# Patient Record
Sex: MALE | Race: WHITE | NOT HISPANIC OR LATINO | Employment: STUDENT | URBAN - METROPOLITAN AREA
[De-identification: names, ages, dates, MRNs, and addresses within clinical notes are randomized per-mention and may not be internally consistent; named-entity substitution may affect disease eponyms.]

---
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
## 2023-01-18 DIAGNOSIS — E10.65 TYPE 1 DIABETES MELLITUS WITH HYPERGLYCEMIA: Chronic | ICD-10-CM

## 2023-01-18 RX ORDER — PROCHLORPERAZINE 25 MG/1
SUPPOSITORY RECTAL
Qty: 1 EACH | Refills: 3 | Status: SHIPPED | OUTPATIENT
Start: 2023-01-18

## 2023-03-16 DIAGNOSIS — E10.65 TYPE 1 DIABETES MELLITUS WITH HYPERGLYCEMIA: Chronic | ICD-10-CM

## 2023-03-17 RX ORDER — INSULIN ASPART 100 [IU]/ML
INJECTION, SOLUTION INTRAVENOUS; SUBCUTANEOUS
Qty: 40 ML | Refills: 5 | Status: SHIPPED | OUTPATIENT
Start: 2023-03-17

## 2023-04-21 ENCOUNTER — OFFICE VISIT (OUTPATIENT)
Dept: ENDOCRINOLOGY | Facility: CLINIC | Age: 18
End: 2023-04-21
Payer: COMMERCIAL

## 2023-04-21 VITALS
HEIGHT: 72 IN | OXYGEN SATURATION: 98 % | WEIGHT: 194 LBS | HEART RATE: 84 BPM | RESPIRATION RATE: 18 BRPM | BODY MASS INDEX: 26.28 KG/M2 | SYSTOLIC BLOOD PRESSURE: 120 MMHG | DIASTOLIC BLOOD PRESSURE: 72 MMHG

## 2023-04-21 DIAGNOSIS — Z86.39 HISTORY OF HYPOTHYROIDISM: ICD-10-CM

## 2023-04-21 DIAGNOSIS — E10.65 TYPE 1 DIABETES MELLITUS WITH HYPERGLYCEMIA: Primary | Chronic | ICD-10-CM

## 2023-04-21 DIAGNOSIS — Z96.41 PRESENCE OF INSULIN PUMP: ICD-10-CM

## 2023-04-21 LAB
ALBUMIN SERPL-MCNC: 4.6 G/DL (ref 3.5–5.2)
ALBUMIN UR-MCNC: <1.2 MG/DL
ALBUMIN/GLOB SERPL: 1.8 G/DL
ALP SERPL-CCNC: 79 U/L (ref 39–117)
ALT SERPL W P-5'-P-CCNC: 19 U/L (ref 1–41)
ANION GAP SERPL CALCULATED.3IONS-SCNC: 12.4 MMOL/L (ref 5–15)
AST SERPL-CCNC: 21 U/L (ref 1–40)
BILIRUB SERPL-MCNC: 0.5 MG/DL (ref 0–1.2)
BUN SERPL-MCNC: 13 MG/DL (ref 6–20)
BUN/CREAT SERPL: 12.1 (ref 7–25)
CALCIUM SPEC-SCNC: 9.6 MG/DL (ref 8.6–10.5)
CHLORIDE SERPL-SCNC: 101 MMOL/L (ref 98–107)
CHOLEST SERPL-MCNC: 154 MG/DL (ref 0–200)
CO2 SERPL-SCNC: 26.6 MMOL/L (ref 22–29)
CREAT SERPL-MCNC: 1.07 MG/DL (ref 0.76–1.27)
CREAT UR-MCNC: 46.8 MG/DL
EGFRCR SERPLBLD CKD-EPI 2021: 102.5 ML/MIN/1.73
EXPIRATION DATE: NORMAL
GLOBULIN UR ELPH-MCNC: 2.5 GM/DL
GLUCOSE BLDC GLUCOMTR-MCNC: 172 MG/DL (ref 70–130)
GLUCOSE SERPL-MCNC: 154 MG/DL (ref 65–99)
HBA1C MFR BLD: 8.2 %
HDLC SERPL-MCNC: 55 MG/DL (ref 40–60)
LDLC SERPL CALC-MCNC: 86 MG/DL (ref 0–100)
LDLC/HDLC SERPL: 1.56 {RATIO}
Lab: NORMAL
MICROALBUMIN/CREAT UR: NORMAL MG/G{CREAT}
POTASSIUM SERPL-SCNC: 4.4 MMOL/L (ref 3.5–5.2)
PROT SERPL-MCNC: 7.1 G/DL (ref 6–8.5)
SODIUM SERPL-SCNC: 140 MMOL/L (ref 136–145)
TRIGL SERPL-MCNC: 67 MG/DL (ref 0–150)
VLDLC SERPL-MCNC: 13 MG/DL (ref 5–40)

## 2023-04-21 PROCEDURE — 82043 UR ALBUMIN QUANTITATIVE: CPT | Performed by: PHYSICIAN ASSISTANT

## 2023-04-21 PROCEDURE — 80053 COMPREHEN METABOLIC PANEL: CPT | Performed by: PHYSICIAN ASSISTANT

## 2023-04-21 PROCEDURE — 80061 LIPID PANEL: CPT | Performed by: PHYSICIAN ASSISTANT

## 2023-04-21 PROCEDURE — 82570 ASSAY OF URINE CREATININE: CPT | Performed by: PHYSICIAN ASSISTANT

## 2023-04-21 PROCEDURE — 84443 ASSAY THYROID STIM HORMONE: CPT | Performed by: PHYSICIAN ASSISTANT

## 2023-04-21 NOTE — PROGRESS NOTES
"     Office Note      Date: 2023  Patient Name: Chapin Pillai  MRN: [id number]  : 2005    Chief Complaint   Patient presents with   • Diabetes       History of Present Illness  Chapin Pillai is a 17 y.o. male who presents today for follow up on type 1 diabetes.   Diabetes was diagnosed in .  Known diabetic complications: none.  He remains on the Tandem X2 insulin pump.  He is using the Dexcom G6 CGM.  He has not updated to Control-IQ yet.  He is monitoring glucose ~288 times per day using CGM.  He is frequently adjusting insulin based on glucose readings.  He notes occasional hypoglycemia.  He denies any severe hypoglycemia.  Feet: No issues.  Foot exam today.  Eye exam is due.  ACE inhibitor/ARB: Not indicated.  Statin: Not indicated.  He is working for [employer].  Hours 5AM to 1:30-2PM.  He reports getting 25,000 steps per day.    Review of Systems   Constitutional: Negative.    Cardiovascular: Negative.    Gastrointestinal: Negative.    Endocrine: Negative.      Past Medical History:   Diagnosis Date   • COVID-19    • Hirschsprung's disease    • Hypothyroidism (acquired)    • Type 1 diabetes mellitus       Past Surgical History:   Procedure Laterality Date   • COLON SURGERY      Multiple surgeries, partial colectomy, ileostomy, reversal of ileostomy     The following portions of the patient's history were reviewed and updated as appropriate: allergies, current medications, past family history, past medical history, past social history, past surgical history and problem list.    Vitals:  /72   Pulse 84   Resp 18   Ht 182.9 cm (72\")   Wt 88 kg (194 lb)   SpO2 98%   BMI 26.31 kg/m²     Physical Exam  Vitals reviewed.   Constitutional:       General: He is not in acute distress.  Neck:      Thyroid: No thyroid mass, thyromegaly (firm, not significantly enlarged) or thyroid tenderness.   Cardiovascular:      Pulses:           Dorsalis pedis pulses are 2+ on the right side and 2+ on the left " side.        Posterior tibial pulses are 2+ on the right side and 2+ on the left side.   Musculoskeletal:      Right foot: No deformity.      Left foot: No deformity.   Feet:      Right foot:      Protective Sensation: 8 sites tested. 8 sites sensed.      Skin integrity: Skin integrity normal.      Toenail Condition: Right toenails are normal.      Left foot:      Protective Sensation: 8 sites tested. 8 sites sensed.      Skin integrity: Skin integrity normal.      Toenail Condition: Left toenails are normal.      Comments: Diabetic Foot Exam Performed and Monofilament Test Performed  Lymphadenopathy:      Cervical: No cervical adenopathy.   Neurological:      Mental Status: He is alert and oriented to person, place, and time.   Psychiatric:         Mood and Affect: Mood and affect normal.         Labs/Imaging    Hemoglobin A1c  Lab Results   Component Value Date    HGBA1C 8.2 04/21/2023    HGBA1C 7.8 09/23/2022    HGBA1C 8.4 04/27/2022     Glucose  Lab Results   Component Value Date    POCGLU 172 (A) 04/21/2023       Current Outpatient Medications   Medication Instructions   • BD Pen Needle Pati 2nd Gen 32G X 4 MM misc USE TO INJECT INSULIN WITH PEN IF PUMP NOT WORKING   • Continuous Blood Gluc Sensor (Dexcom G6 Sensor) Does not apply, Every 10 Days   • Continuous Blood Gluc Transmit (Dexcom G6 Transmitter) misc CHANGE EVERY 3 MONTHS   • insulin aspart (NovoLOG FlexPen) 100 UNIT/ML solution pen-injector sc pen PRN off insulin pump: inject 1 unit per 7 g carbs with meals plus correction 1 unit per 38 over 130; max 60 units daily.   • Insulin Glargine (LANTUS SOLOSTAR) 100 UNIT/ML injection pen INJECT 34 UNITS UNDER THE SKIN EVERY DAY IF PUMP OFF   • Insulin Infusion Pump (T:slim X2 Ins Pump/Control-IQ) device Basal 12a 1.2; carb ratio 1:7, correction 1:28, target 130, AIT 3.5h hours   • Insulin Infusion Pump Supplies (T:slim X2 3mL Cartridge) misc CHANGE EVERY TWO (2) DAYS AS DIRECTED   • Insulin Infusion Pump  "Supplies (TruSteel Infusion Set) misc CHANGE EVERY TWO (2) DAYS AS DIRECTED   • Insulin Syringe-Needle U-100 (B-D INS SYRINGE 0.5CC/31GX5/16) 31G X 5/16\" 0.5 ML misc Use 1 daily PRN for insulin injections.   • NovoLOG 100 UNIT/ML injection INJECT UP  UNITS PER DAY VIA INSULIN PUMP AS DIRECTED   • OneTouch Verio test strip Testing once per day PRN along with CGM       Assessment & Plan  1. Type 1 diabetes mellitus with hyperglycemia  Diabetes remains uncontrolled with hyperglycemia.  A1c increased, above goal today at 8.2%.  Reviewed pump/CGM data and discussed with patient.  Sensor glucose average 206 for the past 2 weeks, 40% time in range , 1% low 54-69, <1% very low <54, 25% high >250.  He is giving manual boluses.  Encouraged to start entering carbohydrates and using bolus calculator.  Adjust correction factor to 1:28.  Plan to start using Control-IQ.  Check labs today.  Will go ahead and check nonfasting lipids today.  He ate breakfast burrito, popsicle, and energy drink with sugar.  Encouraged nutritious dietary choices, moderation of carbohydrates, avoidance of added sugar and regular exercise.   - POC Glucose, Blood  - POC Glycosylated Hemoglobin (Hb A1C)  - Comprehensive Metabolic Panel; Future  - Lipid Panel; Future  - Microalbumin / Creatinine Urine Ratio - Urine, Clean Catch; Future    2. History of hypothyroidism  - TSH; Future    Will notify him of pending lab results from today.     Return in about 3 months (around 7/21/2023) for next scheduled follow up. He was advised to contact the office with any interval questions or concerns.    GUERITA Engel  Endocrinology  04/21/2023  "

## 2023-04-22 LAB — TSH SERPL DL<=0.05 MIU/L-ACNC: 2.5 UIU/ML (ref 0.27–4.2)

## 2023-05-21 DIAGNOSIS — E10.65 TYPE 1 DIABETES MELLITUS WITH HYPERGLYCEMIA: Chronic | ICD-10-CM

## 2023-05-22 RX ORDER — PROCHLORPERAZINE 25 MG/1
SUPPOSITORY RECTAL
Qty: 3 EACH | Refills: 5 | Status: SHIPPED | OUTPATIENT
Start: 2023-05-22

## 2023-09-08 DIAGNOSIS — E10.65 TYPE 1 DIABETES MELLITUS WITH HYPERGLYCEMIA: Chronic | ICD-10-CM

## 2023-09-08 RX ORDER — PROCHLORPERAZINE 25 MG/1
1 SUPPOSITORY RECTAL
Qty: 1 EACH | Refills: 3 | Status: SHIPPED | OUTPATIENT
Start: 2023-09-08

## 2023-09-08 NOTE — TELEPHONE ENCOUNTER
Rx Refill Note  Requested Prescriptions     Pending Prescriptions Disp Refills    Continuous Blood Gluc Transmit (Dexcom G6 Transmitter) misc 1 each 3    Dx Code:  E10.65  Last office visit with prescribing clinician: Visit date not found   Last telemedicine visit with prescribing clinician: Visit date not found   Next office visit with prescribing clinician: 1/12/2024                         Would you like a call back once the refill request has been completed: [] Yes [] No    If the office needs to give you a call back, can they leave a voicemail: [] Yes [] No    Carol Love MA  09/08/23, 10:07 EDT

## 2023-09-08 NOTE — TELEPHONE ENCOUNTER
PATIENTS FATHER ACCIDENTALLY THREW AWAY PATIENTS TRANSMITTER AND IS NOT ABLE TO GET A NEW ONE FROM PHARMACY TILL NOVEMBER. PATIENTS MOTHER IS CALLING NEEDING A NEW PRESCRIPTION WITH REFILLS SO PATIENT CAN GET TRANSMITTER AND REFILLS. PHONE NUMBER [phone]

## 2023-10-11 ENCOUNTER — PRIOR AUTHORIZATION (OUTPATIENT)
Dept: ENDOCRINOLOGY | Facility: CLINIC | Age: 18
End: 2023-10-11
Payer: COMMERCIAL

## 2023-10-11 NOTE — TELEPHONE ENCOUNTER
PILO GROSSMAN Key: [id number] - PA Case ID: [id number] - Rx #: [id number] help? Call us at [phone]  Outcome  Approvedtoday  Your PA request has been approved. Additional information will be provided in the approval communication. (Message 1145)  Drug  Dexcom G6 Sensor  Form  [employer] Electronic PA Form (2017 NCPDP

## 2023-12-26 DIAGNOSIS — E10.65 TYPE 1 DIABETES MELLITUS WITH HYPERGLYCEMIA: Chronic | ICD-10-CM

## 2023-12-26 RX ORDER — INSULIN ASPART 100 [IU]/ML
INJECTION, SOLUTION INTRAVENOUS; SUBCUTANEOUS
Qty: 40 ML | Refills: 12 | Status: SHIPPED | OUTPATIENT
Start: 2023-12-26

## 2023-12-26 NOTE — TELEPHONE ENCOUNTER
Rx Refill Note  Requested Prescriptions      No prescriptions requested or ordered in this encounter        Last office visit with prescribing clinician: 4/12/23     Next office visit with prescribing clinician: 1/16/2024       Kimberly Watson (Jodi)  12/26/23, 13:51 EST

## 2024-02-08 ENCOUNTER — OFFICE VISIT (OUTPATIENT)
Dept: ENDOCRINOLOGY | Facility: CLINIC | Age: 19
End: 2024-02-08
Payer: COMMERCIAL

## 2024-02-08 VITALS
BODY MASS INDEX: 26.47 KG/M2 | RESPIRATION RATE: 16 BRPM | OXYGEN SATURATION: 98 % | SYSTOLIC BLOOD PRESSURE: 126 MMHG | HEIGHT: 72 IN | DIASTOLIC BLOOD PRESSURE: 70 MMHG | WEIGHT: 195.4 LBS | HEART RATE: 80 BPM

## 2024-02-08 DIAGNOSIS — E10.65 TYPE 1 DIABETES MELLITUS WITH HYPERGLYCEMIA: Primary | ICD-10-CM

## 2024-02-08 LAB
ALBUMIN SERPL-MCNC: 4.4 G/DL (ref 3.5–5.2)
ALBUMIN UR-MCNC: <1.2 MG/DL
ALBUMIN/GLOB SERPL: 2.2 G/DL
ALP SERPL-CCNC: 80 U/L (ref 39–117)
ALT SERPL W P-5'-P-CCNC: 23 U/L (ref 1–41)
ANION GAP SERPL CALCULATED.3IONS-SCNC: 8 MMOL/L (ref 5–15)
AST SERPL-CCNC: 18 U/L (ref 1–40)
BILIRUB SERPL-MCNC: 0.5 MG/DL (ref 0–1.2)
BUN SERPL-MCNC: 12 MG/DL (ref 6–20)
BUN/CREAT SERPL: 12.6 (ref 7–25)
CALCIUM SPEC-SCNC: 9 MG/DL (ref 8.6–10.5)
CHLORIDE SERPL-SCNC: 101 MMOL/L (ref 98–107)
CHOLEST SERPL-MCNC: 142 MG/DL (ref 0–200)
CO2 SERPL-SCNC: 27 MMOL/L (ref 22–29)
CREAT SERPL-MCNC: 0.95 MG/DL (ref 0.76–1.27)
CREAT UR-MCNC: 66.8 MG/DL
EGFRCR SERPLBLD CKD-EPI 2021: 117.5 ML/MIN/1.73
EXPIRATION DATE: ABNORMAL
GLOBULIN UR ELPH-MCNC: 2 GM/DL
GLUCOSE BLDC GLUCOMTR-MCNC: 221 MG/DL (ref 70–130)
GLUCOSE SERPL-MCNC: 252 MG/DL (ref 65–99)
HBA1C MFR BLD: 10.5 % (ref 4.5–5.7)
HDLC SERPL-MCNC: 57 MG/DL (ref 40–60)
LDLC SERPL CALC-MCNC: 64 MG/DL (ref 0–100)
LDLC/HDLC SERPL: 1.08 {RATIO}
Lab: ABNORMAL
MICROALBUMIN/CREAT UR: NORMAL MG/G{CREAT}
POTASSIUM SERPL-SCNC: 4.2 MMOL/L (ref 3.5–5.2)
PROT SERPL-MCNC: 6.4 G/DL (ref 6–8.5)
SODIUM SERPL-SCNC: 136 MMOL/L (ref 136–145)
T4 FREE SERPL-MCNC: 1.23 NG/DL (ref 0.93–1.7)
TRIGL SERPL-MCNC: 118 MG/DL (ref 0–150)
TSH SERPL DL<=0.05 MIU/L-ACNC: 2.31 UIU/ML (ref 0.27–4.2)
VLDLC SERPL-MCNC: 21 MG/DL (ref 5–40)

## 2024-02-08 PROCEDURE — 82947 ASSAY GLUCOSE BLOOD QUANT: CPT | Performed by: PHYSICIAN ASSISTANT

## 2024-02-08 PROCEDURE — 99214 OFFICE O/P EST MOD 30 MIN: CPT | Performed by: PHYSICIAN ASSISTANT

## 2024-02-08 PROCEDURE — 83036 HEMOGLOBIN GLYCOSYLATED A1C: CPT | Performed by: PHYSICIAN ASSISTANT

## 2024-02-08 PROCEDURE — 80053 COMPREHEN METABOLIC PANEL: CPT | Performed by: PHYSICIAN ASSISTANT

## 2024-02-08 PROCEDURE — 84439 ASSAY OF FREE THYROXINE: CPT | Performed by: PHYSICIAN ASSISTANT

## 2024-02-08 PROCEDURE — 82570 ASSAY OF URINE CREATININE: CPT | Performed by: PHYSICIAN ASSISTANT

## 2024-02-08 PROCEDURE — 80061 LIPID PANEL: CPT | Performed by: PHYSICIAN ASSISTANT

## 2024-02-08 PROCEDURE — 84443 ASSAY THYROID STIM HORMONE: CPT | Performed by: PHYSICIAN ASSISTANT

## 2024-02-08 PROCEDURE — 82043 UR ALBUMIN QUANTITATIVE: CPT | Performed by: PHYSICIAN ASSISTANT

## 2024-02-08 RX ORDER — BUPROPION HYDROCHLORIDE 150 MG/1
150 TABLET ORAL EVERY MORNING
COMMUNITY
Start: 2023-11-28 | End: 2024-02-08

## 2024-02-08 RX ORDER — PROCHLORPERAZINE 25 MG/1
1 SUPPOSITORY RECTAL
Qty: 1 EACH | Refills: 3 | Status: SHIPPED | OUTPATIENT
Start: 2024-02-08

## 2024-02-08 RX ORDER — INSULIN ASPART 100 [IU]/ML
INJECTION, SOLUTION INTRAVENOUS; SUBCUTANEOUS
Qty: 40 ML | Refills: 12 | Status: SHIPPED | OUTPATIENT
Start: 2024-02-08

## 2024-02-08 RX ORDER — PROCHLORPERAZINE 25 MG/1
SUPPOSITORY RECTAL
Qty: 3 EACH | Refills: 5 | Status: SHIPPED | OUTPATIENT
Start: 2024-02-08

## 2024-02-08 RX ORDER — INSULIN ASPART 100 [IU]/ML
INJECTION, SOLUTION INTRAVENOUS; SUBCUTANEOUS
Qty: 15 ML | Refills: 0 | Status: SHIPPED | OUTPATIENT
Start: 2024-02-08

## 2024-02-08 RX ORDER — INSULIN PUMP CARTRIDGE
CARTRIDGE (EA) SUBCUTANEOUS
Qty: 45 EACH | Refills: 3 | Status: CANCELLED | OUTPATIENT
Start: 2024-02-08

## 2024-02-09 DIAGNOSIS — E10.65 TYPE 1 DIABETES MELLITUS WITH HYPERGLYCEMIA: Chronic | ICD-10-CM

## 2024-02-12 DIAGNOSIS — E10.65 TYPE 1 DIABETES MELLITUS WITH HYPERGLYCEMIA: Chronic | ICD-10-CM

## 2024-02-12 RX ORDER — INSULIN PUMP CARTRIDGE
CARTRIDGE (EA) SUBCUTANEOUS
Qty: 45 EACH | Refills: 1 | Status: SHIPPED | OUTPATIENT
Start: 2024-02-12

## 2024-02-12 RX ORDER — INSULIN PUMP CARTRIDGE
1 CARTRIDGE (EA) SUBCUTANEOUS EVERY OTHER DAY
Qty: 45 EACH | Refills: 1 | Status: SHIPPED | OUTPATIENT
Start: 2024-02-12

## 2024-04-22 ENCOUNTER — PRIOR AUTHORIZATION (OUTPATIENT)
Dept: ENDOCRINOLOGY | Facility: CLINIC | Age: 19
End: 2024-04-22
Payer: COMMERCIAL

## 2024-04-22 NOTE — LETTER
April 25, 2024    Chapin Pillai  [address] [address]    To Whom It May Concern,    Chapin Pillai is a patient in our clinic with Type 1 diabetes. He is currently using a Tandem insulin pump and the PA for the insulin cartridges were denied. The recommendation was that he would need to try and fail the Omnipod and V-go pumps before they could be approved. I am writing to appeal for a reconsideration of this decision. This patient has been successfully using a Tandem pump for years and it is not reasonable to make him switch to a different pump. Please approve the coverage of the insulin cartridges.     Thank you for considering this request.    Sincerely,        Tammie Chris PA

## 2024-04-22 NOTE — TELEPHONE ENCOUNTER
Insurance has denied the cartridges for Tandem insulin pump stating patient must try and fail the Omnipod and V-go.  Can you do an appeal letter since patient already has the Tandem insulin pump?

## 2024-04-30 ENCOUNTER — TELEPHONE (OUTPATIENT)
Dept: ENDOCRINOLOGY | Facility: CLINIC | Age: 19
End: 2024-04-30

## 2024-04-30 NOTE — TELEPHONE ENCOUNTER
Caller: [facility]    Relationship: PHARM    Best call back number: [phone]    What form or medical record are you requesting: APPEAL    Who is requesting this form or medical record from you: [facility]    How would you like to receive the form or medical records (pick-up, mail, fax): FAX  If fax, what is the fax number: [phone]    Timeframe paperwork needed: ASAP    Additional notes: REGARDING T-SLIM CARTRIDGES

## 2024-05-10 ENCOUNTER — TELEPHONE (OUTPATIENT)
Dept: ENDOCRINOLOGY | Facility: CLINIC | Age: 19
End: 2024-05-10

## 2024-05-10 NOTE — TELEPHONE ENCOUNTER
[facility] CALLED STATING INSURANCE DOES NOT HAVE AN APPEAL ON FILE FOR PT'S T-SLIM CARTRIDGES. THEY REQUESTED WE SUBMIT ONE.

## 2024-05-13 NOTE — TELEPHONE ENCOUNTER
[facility] CALLED ABOUT SETTING UP PEER TO PEER WITH KAYLEIGH MITCHELL AND DR. PATRICK TO SPEAK ABOUT THE T-SLIM. THEY WAS GIVEN A DATE 5/14/24 AT 1:30 PM TO TO THE PEER TO PEER. SHE IS GOING TO CHECK WITH THE DOCTOR ON HER END AND CONFIRM THE SET UP FOR TOMORROW.

## 2024-05-14 NOTE — TELEPHONE ENCOUNTER
She called back. Confirmed this is scheduled for tomorrow at 1:30PM. They will call the office and ask for me. Once they call we will need to get them transferred to Tammie Chris.

## 2024-05-14 NOTE — TELEPHONE ENCOUNTER
"Arnie Marlow called who works with [employer]. Stated they have to cancel the peer to peer for this afternoon because the physician has another \"emergency meeting.\" She will be calling us back to let us know if they can do the peer to peer tomorrow at 1:30PM or Thursday at 1:30PM.     Her call back if needed [phone]    I have already let Tammie Chris know that this was cancelled for this afternoon.   "

## 2024-05-16 ENCOUNTER — TELEPHONE (OUTPATIENT)
Dept: ENDOCRINOLOGY | Facility: CLINIC | Age: 19
End: 2024-05-16
Payer: COMMERCIAL

## 2024-05-16 NOTE — TELEPHONE ENCOUNTER
Please let the patient know that I did a ialw-xx-wavh with an MD associated with his insurance and he said that insurance will not cover the Tandem supplies unless the patient has tried the OmniPod or V-go. In spite of my insistence that patient has been on the Tandem pump for years, he said there is no way around this.     Has he ever tried either of those pumps in the distant past?    If not, he will have to try the Omnipod 5. He can still use Dexcom. I can send it to the pharmacy and we can let the trainers know that he will be switching.

## 2024-05-16 NOTE — TELEPHONE ENCOUNTER
Wrote a new appeal letter with updated information that patient tried an Omnipod pump in the past.

## 2024-05-16 NOTE — LETTER
May 16, 2024    Chapin Pillai  [address] [facility] [facility] [facility] [address]    To Whom It May Concern,     Chapin Pillai is a patient in our clinic with Type 1 diabetes. He is currently using a Tandem insulin pump and he has been denied coverage for some of the supplies for the pump. The recommendation was that he would need to try and fail the Omnipod and V-go pumps before they could be approved. I am writing to appeal for a reconsideration of this decision. The patient has tried an Omnipod pump in the past and was not able to use it successfully due to problems with scar tissue. The V-go pump is not a good option for a Type 1 diabetic because the insulin amounts are limited and do not provide adequate tight control of blood glucose. This patient has been successfully using a Tandem pump for years and it is not reasonable to make him switch to a different pump. Please approve the coverage of the supplies he needs.       Thank you for considering this request.     Sincerely,           GUERITA Petersen PA

## 2024-05-22 ENCOUNTER — TELEPHONE (OUTPATIENT)
Dept: ENDOCRINOLOGY | Facility: CLINIC | Age: 19
End: 2024-05-22

## 2024-05-22 NOTE — TELEPHONE ENCOUNTER
PT'S MOTHER CALLED STATING WE WERE TO PROVIDE THE PT A LETTER FOR HIM TO SIGN FOR AN APPEAL FOR HIS PUMP. SHE REQUESTED A CALL BACK TO CONSULT.

## 2024-05-24 NOTE — TELEPHONE ENCOUNTER
Spoke with patients mother.  Received form via [employer].  Will have patient sign and return to office.

## 2024-09-03 DIAGNOSIS — E10.65 TYPE 1 DIABETES MELLITUS WITH HYPERGLYCEMIA: Chronic | ICD-10-CM

## 2024-09-03 RX ORDER — INSULIN PUMP CARTRIDGE
CARTRIDGE (EA) SUBCUTANEOUS
Qty: 45 EACH | Refills: 2 | Status: SHIPPED | OUTPATIENT
Start: 2024-09-03

## 2024-09-03 RX ORDER — INFUSION SET FOR INSULIN PUMP
INFUSION SETS-PARAPHERNALIA MISCELLANEOUS
Qty: 45 EACH | Refills: 2 | Status: SHIPPED | OUTPATIENT
Start: 2024-09-03

## 2024-09-03 NOTE — TELEPHONE ENCOUNTER
"Rx Refill Note  Requested Prescriptions     Pending Prescriptions Disp Refills    Insulin Infusion Pump Supplies (T:slim X2 3mL Cartridge) misc [Pharmacy Med Name: T:SLIM X2 3 ML CARTRIDGE 3ML CART MISC] 45 each 2     Sig: CHANGE EVERY TWO (2) DAYS    Insulin Infusion Pump Supplies (TruSteel Infusion Set) misc [Pharmacy Med Name: TRUSTEEL INFUSION SET/23\"/8MM 23\"/8MM MISC] 45 each 2     Sig: CHANGE EVERY TWO (2) DAYS      Last office visit with prescribing clinician: 2/8/2024   Last telemedicine visit with prescribing clinician: Visit date not found   Next office visit with prescribing clinician: 12/26/2024Visit date not found                         Would you like a call back once the refill request has been completed: [] Yes [] No    If the office needs to give you a call back, can they leave a voicemail: [] Yes [] No    Love Phillips MA  09/03/24, 15:35 EDT  "

## 2024-10-31 ENCOUNTER — PRIOR AUTHORIZATION (OUTPATIENT)
Dept: ENDOCRINOLOGY | Facility: CLINIC | Age: 19
End: 2024-10-31
Payer: COMMERCIAL

## 2024-10-31 NOTE — TELEPHONE ENCOUNTER
PILO GROSSMAN (Key: [id number])  PA Case ID #: [id number]  Rx #: [id number]  Need Help? Call us at [phone]  Outcome  Approved today by [employer] 2017  Your PA request has been approved. Additional information will be provided in the approval communication. (Message 1144)  Authorization Expiration Date: 10/31/2025  Drug  Dexcom G6 Sensor  ePA cloud logo  Form  [employer] Electronic PA Form (2017 NCPDP)

## 2025-01-10 ENCOUNTER — TELEPHONE (OUTPATIENT)
Dept: ENDOCRINOLOGY | Facility: CLINIC | Age: 20
End: 2025-01-10
Payer: COMMERCIAL

## 2025-01-10 DIAGNOSIS — E10.65 TYPE 1 DIABETES MELLITUS WITH HYPERGLYCEMIA: Primary | ICD-10-CM

## 2025-01-10 NOTE — TELEPHONE ENCOUNTER
Caller: SHABNAM GROSSMAN    Relationship: Mother    Best call back number: [phone]    What form or medical record are you requesting: TRANSFER OF RECORDS     Who is requesting this form or medical record from you: [facility]     How would you like to receive the form or medical records (pick-up, mail, fax): FAX  If fax, what is the fax number: [phone]    Timeframe paperwork needed: ASAP    Additional notes:   PT MOTHER CALLED NEEDING A TRANSFER OF RECORDS FOR PT TO [facility].   FAX # : [phone]. PLEASE ADVISE.

## 2025-02-24 DIAGNOSIS — E10.65 TYPE 1 DIABETES MELLITUS WITH HYPERGLYCEMIA: ICD-10-CM

## 2025-02-24 RX ORDER — INSULIN ASPART 100 [IU]/ML
INJECTION, SOLUTION INTRAVENOUS; SUBCUTANEOUS
Qty: 40 ML | Refills: 12 | OUTPATIENT
Start: 2025-02-24